# Patient Record
Sex: FEMALE | Race: WHITE | NOT HISPANIC OR LATINO | Employment: FULL TIME | RURAL
[De-identification: names, ages, dates, MRNs, and addresses within clinical notes are randomized per-mention and may not be internally consistent; named-entity substitution may affect disease eponyms.]

---

## 2017-10-26 ENCOUNTER — CONVERSION ENCOUNTER (OUTPATIENT)
Dept: FAMILY MEDICINE CLINIC | Facility: CLINIC | Age: 27
End: 2017-10-26

## 2017-10-27 LAB
ALBUMIN SERPL-MCNC: 3.9 G/DL (ref 3.6–5.1)
ALP SERPL-CCNC: 42 U/L (ref 33–115)
AST SERPL-CCNC: 17 U/L (ref 10–30)
BASOPHILS # BLD AUTO: 100 CELLS/UL (ref 0–200)
BILIRUB SERPL-MCNC: 0.2 MG/DL (ref 0.2–1.2)
BUN SERPL-MCNC: 6 MG/DL (ref 7–25)
BUN/CREAT SERPL: 8.6 (CALC) (ref 6–22)
CALCIUM SERPL-MCNC: 9.5 MG/DL (ref 8.6–10.2)
CHLORIDE SERPL-SCNC: 103 MMOL/L (ref 98–110)
CHOLEST SERPL-MCNC: 216 MG/DL (ref 125–200)
CONV CO2: 22 MMOL/L (ref 21–33)
CONV TOTAL PROTEIN: 7.2 G/DL (ref 6.2–8.3)
CREAT UR-MCNC: 0.7 MG/DL (ref 0.57–1.03)
EOSINOPHIL # BLD AUTO: 300 CELLS/UL (ref 15–500)
EOSINOPHIL # BLD AUTO: 5 %
ERYTHROCYTE [DISTWIDTH] IN BLOOD BY AUTOMATED COUNT: 16.4 % (ref 11–15)
GLOBULIN UR ELPH-MCNC: 3.3 MG/DL (ref 2.2–3.9)
GLUCOSE UR QL: 74 MG/DL (ref 65–99)
HCT VFR BLD AUTO: 36.1 % (ref 35–45)
HDLC SERPL-MCNC: 39 MG/DL
HGB BLD-MCNC: 11.7 G/DL (ref 11.7–15.5)
INSULIN SERPL-ACNC: 1.2 (CALC) (ref 1–2.1)
LDLC SERPL CALC-MCNC: 133 MG/DL
LYMPHOCYTES # BLD AUTO: 2200 CELLS/UL (ref 850–3900)
LYMPHOCYTES NFR BLD AUTO: 31 %
MCH RBC QN AUTO: 24.2 PG (ref 27–33)
MCHC RBC AUTO-ENTMCNC: 32.5 G/DL (ref 32–36)
MCV RBC AUTO: 74.4 FL (ref 80–100)
MONOCYTES # BLD AUTO: 400 CELLS/UL (ref 200–950)
MONOCYTES NFR BLD AUTO: 6 %
NEUTROPHILS # BLD AUTO: 4000 CELLS/UL (ref 1500–7800)
NEUTROPHILS NFR BLD AUTO: 58 %
PLATELET # BLD AUTO: 264 10*3/UL (ref 140–400)
PMV BLD AUTO: 8.5 FL (ref 7.5–11.5)
POTASSIUM SERPL-SCNC: 4.5 MMOL/L (ref 3.5–5.3)
RBC # BLD AUTO: 4.85 MILLION/UL (ref 3.8–5.1)
SODIUM SERPL-SCNC: 137 MMOL/L (ref 135–146)
TRIGL SERPL-MCNC: 219 MG/DL
TSH SERPL-ACNC: 2.14 MIU/L
WBC # BLD AUTO: 7 10*3/UL (ref 3.8–10.8)

## 2017-11-01 ENCOUNTER — CONVERSION ENCOUNTER (OUTPATIENT)
Dept: FAMILY MEDICINE CLINIC | Facility: CLINIC | Age: 27
End: 2017-11-01

## 2017-11-01 LAB — TB SKIN TEST: NEGATIVE MM

## 2020-02-28 ENCOUNTER — HOSPITAL ENCOUNTER (OUTPATIENT)
Dept: GENERAL RADIOLOGY | Facility: HOSPITAL | Age: 30
Discharge: HOME | End: 2020-02-28
Admitting: FAMILY MEDICINE

## 2020-02-28 ENCOUNTER — OFFICE VISIT (OUTPATIENT)
Dept: FAMILY MEDICINE CLINIC | Facility: CLINIC | Age: 30
End: 2020-02-28

## 2020-02-28 VITALS
BODY MASS INDEX: 30.29 KG/M2 | HEART RATE: 112 BPM | HEIGHT: 67 IN | DIASTOLIC BLOOD PRESSURE: 76 MMHG | TEMPERATURE: 98 F | WEIGHT: 193 LBS | OXYGEN SATURATION: 99 % | SYSTOLIC BLOOD PRESSURE: 118 MMHG

## 2020-02-28 DIAGNOSIS — F33.1 MODERATE EPISODE OF RECURRENT MAJOR DEPRESSIVE DISORDER (HCC): Primary | ICD-10-CM

## 2020-02-28 DIAGNOSIS — M54.12 CERVICAL RADICULOPATHY: ICD-10-CM

## 2020-02-28 DIAGNOSIS — M54.2 NECK PAIN: ICD-10-CM

## 2020-02-28 DIAGNOSIS — M79.642 BILATERAL HAND PAIN: ICD-10-CM

## 2020-02-28 DIAGNOSIS — F41.9 ANXIETY: ICD-10-CM

## 2020-02-28 DIAGNOSIS — A60.04 HERPES SIMPLEX VULVOVAGINITIS: ICD-10-CM

## 2020-02-28 DIAGNOSIS — Z00.01 ENCOUNTER FOR GENERAL ADULT MEDICAL EXAMINATION WITH ABNORMAL FINDINGS: ICD-10-CM

## 2020-02-28 DIAGNOSIS — M79.641 BILATERAL HAND PAIN: ICD-10-CM

## 2020-02-28 DIAGNOSIS — Z13.220 SCREENING FOR HYPERLIPIDEMIA: ICD-10-CM

## 2020-02-28 PROBLEM — F32.A DEPRESSIVE DISORDER: Status: RESOLVED | Noted: 2020-02-28 | Resolved: 2020-02-28

## 2020-02-28 PROBLEM — K90.41 GLUTEN-SENSITIVE ENTEROPATHY: Status: ACTIVE | Noted: 2020-02-28

## 2020-02-28 PROBLEM — R53.83 FATIGUE: Status: ACTIVE | Noted: 2020-02-28

## 2020-02-28 PROBLEM — D64.9 ANEMIA: Status: ACTIVE | Noted: 2020-02-28

## 2020-02-28 PROBLEM — F32.A DEPRESSIVE DISORDER: Status: ACTIVE | Noted: 2020-02-28

## 2020-02-28 PROCEDURE — 99213 OFFICE O/P EST LOW 20 MIN: CPT | Performed by: FAMILY MEDICINE

## 2020-02-28 PROCEDURE — 99395 PREV VISIT EST AGE 18-39: CPT | Performed by: FAMILY MEDICINE

## 2020-02-28 PROCEDURE — 72050 X-RAY EXAM NECK SPINE 4/5VWS: CPT

## 2020-02-28 RX ORDER — HYDROXYZINE HYDROCHLORIDE 25 MG/1
25 TABLET, FILM COATED ORAL NIGHTLY PRN
Qty: 30 TABLET | Refills: 3 | Status: SHIPPED | OUTPATIENT
Start: 2020-02-28 | End: 2021-01-19

## 2020-02-28 RX ORDER — ACYCLOVIR 200 MG/1
200 CAPSULE ORAL
Qty: 25 CAPSULE | Refills: 12 | Status: SHIPPED | OUTPATIENT
Start: 2020-02-28 | End: 2021-01-19

## 2020-02-28 RX ORDER — NAPROXEN 500 MG/1
500 TABLET ORAL 2 TIMES DAILY
Qty: 60 TABLET | Refills: 2 | Status: SHIPPED | OUTPATIENT
Start: 2020-02-28 | End: 2021-01-19

## 2020-02-28 RX ORDER — BUSPIRONE HYDROCHLORIDE 7.5 MG/1
7.5 TABLET ORAL TAKE AS DIRECTED
Qty: 53 TABLET | Refills: 1 | Status: SHIPPED | OUTPATIENT
Start: 2020-02-28 | End: 2020-03-30

## 2020-02-28 NOTE — PROGRESS NOTES
"Subjective   Mary Mathur is a 29 y.o. female.     Chief Complaint   Patient presents with   • Annual Exam   • Hand Pain   • Depression     Mary is here for a Well Woman Visit. Energy level is described as poor and she is sleeping poorly. She sleeps 3 hours nightly. Last pap was 10/2/2017 (negative). Contraception: spermicide. Patient exercises 2-3 times a week( yoga) . Nutrition is described as in general, an \"unhealthy\" diet. She reports that she does perform monthly self breast exam.      Hand Pain    The incident occurred more than 1 week ago. There was no injury mechanism. The pain is present in the left hand and right hand. The quality of the pain is described as stabbing. The pain does not radiate. The pain is at a severity of 4/10. The pain is moderate. The pain has been constant since the incident. Associated symptoms include tingling. Pertinent negatives include no chest pain. Nothing aggravates the symptoms. She has tried acetaminophen and NSAIDs for the symptoms. The treatment provided no relief.   Depression   Visit Type: initial  Onset of symptoms: more than 1 year ago  Progression since onset: gradually worsening  Patient presents with the following symptoms: depressed mood, excessive worry, insomnia and nervousness/anxiety.  Patient is not experiencing: feelings of hopelessness, feelings of worthlessness, shortness of breath, suicidal ideas, suicidal planning and thoughts of death.  Frequency of symptoms: most days   Severity: moderate   Sleep quality: fair       She has always had a little numbess on occasion in the left hand, but over the last 10 months she has noticed that both of her hands tingle and go numb. The numbness in the palms.   Her sister  last year  She has tried 5ht and st vega wort for a few months and going to therapy. This is what keeps her from sleeping.     The following portions of the patient's history were reviewed and updated as appropriate: allergies, current " medications, past family history, past medical history, past social history, past surgical history and problem list.    Allergies:  Allergies   Allergen Reactions   • Sulfa Antibiotics Itching       Social History:  Social History     Socioeconomic History   • Marital status: Unknown     Spouse name: Not on file   • Number of children: Not on file   • Years of education: Not on file   • Highest education level: Not on file   Tobacco Use   • Smoking status: Current Every Day Smoker     Types: Electronic Cigarette   • Smokeless tobacco: Never Used   Substance and Sexual Activity   • Alcohol use: Yes     Comment: Remotely quit alcohol use. Quit 01/2005   • Drug use: Not Currently     Comment: Pt recently quit 01/05/2014       Family History:  Family History   Problem Relation Age of Onset   • Cervical cancer Mother        Past Medical History :  Patient Active Problem List   Diagnosis   • Anemia   • Anxiety   • Herpes simplex vulvovaginitis   • Moderate episode of recurrent major depressive disorder (CMS/HCC)   • Neck pain   • Cervical radiculitis   • Gluten-sensitive enteropathy   • Fatigue       Medication List:    Current Outpatient Medications:   •  acyclovir (ZOVIRAX) 200 MG capsule, Take 1 capsule by mouth Every 4 (Four) Hours While Awake. Take at first sign of recurrence., Disp: 25 capsule, Rfl: 12  •  busPIRone (BUSPAR) 7.5 MG tablet, Take 1 tablet by mouth Take As Directed. 1 daily for 7 days, then BID., Disp: 53 tablet, Rfl: 1  •  hydrOXYzine (ATARAX) 25 MG tablet, Take 1 tablet by mouth At Night As Needed for Anxiety., Disp: 30 tablet, Rfl: 3  •  naproxen (NAPROSYN) 500 MG tablet, Take 1 tablet by mouth 2 (Two) Times a Day., Disp: 60 tablet, Rfl: 2    Past Surgical History:  Past Surgical History:   Procedure Laterality Date   • DILATATION AND CURETTAGE  2010    Of Uterus   • TONSILLECTOMY AND ADENOIDECTOMY  1994       Review of Systems:  Review of Systems   Constitutional: Negative for activity change and  "fever.   HENT: Negative for ear pain, rhinorrhea, sinus pressure and voice change.    Eyes: Negative for visual disturbance.   Respiratory: Negative for cough and shortness of breath.    Cardiovascular: Negative for chest pain.   Gastrointestinal: Negative for abdominal pain, diarrhea, nausea and vomiting.   Endocrine: Negative for cold intolerance and heat intolerance.   Genitourinary: Negative for frequency and urgency.   Musculoskeletal: Negative for arthralgias.   Skin: Negative for rash.   Neurological: Positive for tingling. Negative for syncope.   Hematological: Does not bruise/bleed easily.   Psychiatric/Behavioral: Positive for depressed mood. Negative for suicidal ideas. The patient is nervous/anxious and has insomnia.        Physical Exam:  Vital Signs:  Visit Vitals  /76 (BP Location: Left arm, Patient Position: Sitting, Cuff Size: Adult)   Pulse 112   Temp 98 °F (36.7 °C) (Oral)   Ht 170.2 cm (67\")   Wt 87.5 kg (193 lb)   SpO2 99%   BMI 30.23 kg/m²       Physical Exam   Constitutional: She is oriented to person, place, and time. She appears well-developed and well-nourished. No distress.   HENT:   Head: Normocephalic and atraumatic.   Right Ear: External ear normal.   Left Ear: External ear normal.   Nose: Nose normal.   Mouth/Throat: Oropharynx is clear and moist. No oropharyngeal exudate.   Eyes: Pupils are equal, round, and reactive to light. Conjunctivae and EOM are normal. Right eye exhibits no discharge. Left eye exhibits no discharge. No scleral icterus.   Neck: Normal range of motion. Neck supple. No tracheal deviation present. No thyromegaly present.   Cardiovascular: Normal rate, regular rhythm, normal heart sounds and intact distal pulses. Exam reveals no gallop and no friction rub.   No murmur heard.  Pulmonary/Chest: Effort normal and breath sounds normal. No stridor. No respiratory distress. She has no wheezes. She has no rales.   Abdominal: Soft. Bowel sounds are normal. She " exhibits no distension and no mass. There is no tenderness. There is no rebound and no guarding.   Musculoskeletal: Normal range of motion. She exhibits no edema, tenderness or deformity.   Lymphadenopathy:     She has no cervical adenopathy.   Neurological: She is alert and oriented to person, place, and time. She has normal strength and normal reflexes. She displays no atrophy, no tremor and normal reflexes. No cranial nerve deficit or sensory deficit. She exhibits normal muscle tone. Coordination and gait normal.   Skin: Skin is warm and dry. Capillary refill takes less than 2 seconds. No rash noted. She is not diaphoretic. No erythema. No pallor.   Psychiatric: She has a normal mood and affect. Her behavior is normal. Judgment and thought content normal. Cognition and memory are normal. Cognition and memory are not impaired. She exhibits normal recent memory and normal remote memory.   Nursing note and vitals reviewed.      Assessment and Plan:  Problem List Items Addressed This Visit        Nervous and Auditory    Neck pain    Relevant Medications    naproxen (NAPROSYN) 500 MG tablet    Other Relevant Orders    XR Spine Cervical Complete 4 or 5 View (Completed)    Cervical radiculitis    Current Assessment & Plan     Will get EMG, xray and place her in PT         Relevant Medications    naproxen (NAPROSYN) 500 MG tablet       Genitourinary    Herpes simplex vulvovaginitis    Overview     Diagnosis, treatment and course discussed. Discussed medication, dosing and adverse effects. Return if there is worsening or persistance of symptoms  Refill medication         Relevant Medications    acyclovir (ZOVIRAX) 200 MG capsule       Other    Anxiety    Overview     Good social support, no suicidal or homicidal ideation. Diagnosis and treatment discussed. Discussed counseling. Discussed medication, dosing and adverse effects. Return in 4 weeks         Relevant Medications    busPIRone (BUSPAR) 7.5 MG tablet     hydrOXYzine (ATARAX) 25 MG tablet    Moderate episode of recurrent major depressive disorder (CMS/HCC) - Primary    Overview     Good social support, no suicidal or homicidal ideation. Diagnosis and treatment discussed. Discussed counseling. Discussed medication, dosing and adverse effects. Return in 4 weeks  [facility] in the past. Stable         Relevant Medications    busPIRone (BUSPAR) 7.5 MG tablet    hydrOXYzine (ATARAX) 25 MG tablet      Other Visit Diagnoses     Encounter for general adult medical examination with abnormal findings        Relevant Orders    CBC & Differential    Comprehensive Metabolic Panel    TSH    Bilateral hand pain        From neck    Screening for hyperlipidemia        Relevant Orders    Lipid Panel With / Chol / HDL Ratio        Ice three times a day for about 10-15 minutes for the first 1-2 days. Then may alternate heat and ice. Better body mechanics discussed. Home exercises discussed and hand out given. Discussed nsaids and if they can be taken. May need imaging and or PT if persists.  Good social support, no suicidal or homicidal ideation. Diagnosis and treatment discussed. Discussed counseling. Discussed medication, dosing and adverse effects. Return in 4 weeks    Declines gyn and breast exam today      An After Visit Summary and PPPS were given to the patient.

## 2020-03-02 ENCOUNTER — TRANSCRIBE ORDERS (OUTPATIENT)
Dept: PHYSICAL THERAPY | Facility: CLINIC | Age: 30
End: 2020-03-02

## 2020-03-02 DIAGNOSIS — M54.2 PAIN, NECK: Primary | ICD-10-CM

## 2020-03-03 LAB
ALBUMIN SERPL-MCNC: 4.2 G/DL (ref 3.8–4.8)
ALBUMIN/GLOB SERPL: 1.4 {RATIO} (ref 1.2–2.2)
ALP SERPL-CCNC: 66 IU/L (ref 39–117)
ALT SERPL-CCNC: 18 IU/L (ref 0–32)
AST SERPL-CCNC: 18 IU/L (ref 0–40)
BASOPHILS # BLD AUTO: 0 X10E3/UL (ref 0–0.2)
BASOPHILS NFR BLD AUTO: 1 %
BILIRUB SERPL-MCNC: <0.2 MG/DL (ref 0–1.2)
BUN SERPL-MCNC: 12 MG/DL (ref 6–20)
BUN/CREAT SERPL: 16 (ref 9–23)
CALCIUM SERPL-MCNC: 9.2 MG/DL (ref 8.7–10.2)
CHLORIDE SERPL-SCNC: 103 MMOL/L (ref 96–106)
CHOLEST SERPL-MCNC: 185 MG/DL (ref 100–199)
CHOLEST/HDLC SERPL: 5.4 RATIO (ref 0–4.4)
CO2 SERPL-SCNC: 22 MMOL/L (ref 20–29)
CREAT SERPL-MCNC: 0.75 MG/DL (ref 0.57–1)
EOSINOPHIL # BLD AUTO: 0.2 X10E3/UL (ref 0–0.4)
EOSINOPHIL NFR BLD AUTO: 3 %
ERYTHROCYTE [DISTWIDTH] IN BLOOD BY AUTOMATED COUNT: 14.2 % (ref 11.7–15.4)
GLOBULIN SER CALC-MCNC: 2.9 G/DL (ref 1.5–4.5)
GLUCOSE SERPL-MCNC: 102 MG/DL (ref 65–99)
HCT VFR BLD AUTO: 37.2 % (ref 34–46.6)
HDLC SERPL-MCNC: 34 MG/DL
HGB BLD-MCNC: 11.7 G/DL (ref 11.1–15.9)
IMM GRANULOCYTES # BLD AUTO: 0 X10E3/UL (ref 0–0.1)
IMM GRANULOCYTES NFR BLD AUTO: 0 %
LDLC SERPL CALC-MCNC: 106 MG/DL (ref 0–99)
LYMPHOCYTES # BLD AUTO: 2.6 X10E3/UL (ref 0.7–3.1)
LYMPHOCYTES NFR BLD AUTO: 34 %
MCH RBC QN AUTO: 24.9 PG (ref 26.6–33)
MCHC RBC AUTO-ENTMCNC: 31.5 G/DL (ref 31.5–35.7)
MCV RBC AUTO: 79 FL (ref 79–97)
MONOCYTES # BLD AUTO: 0.6 X10E3/UL (ref 0.1–0.9)
MONOCYTES NFR BLD AUTO: 7 %
NEUTROPHILS # BLD AUTO: 4.2 X10E3/UL (ref 1.4–7)
NEUTROPHILS NFR BLD AUTO: 55 %
PLATELET # BLD AUTO: 297 X10E3/UL (ref 150–450)
POTASSIUM SERPL-SCNC: 4.7 MMOL/L (ref 3.5–5.2)
PROT SERPL-MCNC: 7.1 G/DL (ref 6–8.5)
RBC # BLD AUTO: 4.69 X10E6/UL (ref 3.77–5.28)
SODIUM SERPL-SCNC: 137 MMOL/L (ref 134–144)
TRIGL SERPL-MCNC: 225 MG/DL (ref 0–149)
TSH SERPL DL<=0.005 MIU/L-ACNC: 2.57 UIU/ML (ref 0.45–4.5)
VLDLC SERPL CALC-MCNC: 45 MG/DL (ref 5–40)
WBC # BLD AUTO: 7.6 X10E3/UL (ref 3.4–10.8)

## 2020-03-30 ENCOUNTER — OFFICE VISIT (OUTPATIENT)
Dept: FAMILY MEDICINE CLINIC | Facility: CLINIC | Age: 30
End: 2020-03-30

## 2020-03-30 VITALS
HEART RATE: 108 BPM | TEMPERATURE: 98.6 F | BODY MASS INDEX: 29.49 KG/M2 | RESPIRATION RATE: 18 BRPM | HEIGHT: 67 IN | OXYGEN SATURATION: 97 % | SYSTOLIC BLOOD PRESSURE: 122 MMHG | DIASTOLIC BLOOD PRESSURE: 80 MMHG | WEIGHT: 187.9 LBS

## 2020-03-30 DIAGNOSIS — F33.1 MODERATE EPISODE OF RECURRENT MAJOR DEPRESSIVE DISORDER (HCC): Primary | ICD-10-CM

## 2020-03-30 DIAGNOSIS — E78.2 MIXED HYPERLIPIDEMIA: ICD-10-CM

## 2020-03-30 DIAGNOSIS — F41.9 ANXIETY: ICD-10-CM

## 2020-03-30 DIAGNOSIS — M54.2 NECK PAIN: ICD-10-CM

## 2020-03-30 PROBLEM — E78.5 HYPERLIPIDEMIA: Status: ACTIVE | Noted: 2020-03-30

## 2020-03-30 PROCEDURE — 99214 OFFICE O/P EST MOD 30 MIN: CPT | Performed by: FAMILY MEDICINE

## 2020-03-30 RX ORDER — BUSPIRONE HYDROCHLORIDE 10 MG/1
10 TABLET ORAL 3 TIMES DAILY
Qty: 90 TABLET | Refills: 3 | Status: SHIPPED | OUTPATIENT
Start: 2020-03-30 | End: 2021-01-19

## 2020-03-30 NOTE — PROGRESS NOTES
Subjective   Mary Mathur is a 29 y.o. female.     Chief Complaint   Patient presents with   • Hyperlipidemia   • Neck Pain       Hyperlipidemia   This is a new problem. This is a new diagnosis. The problem is uncontrolled. Recent lipid tests were reviewed and are high. Factors aggravating her hyperlipidemia include smoking (e- cig). Pertinent negatives include no chest pain or focal sensory loss. She is currently on no antihyperlipidemic treatment.   Neck Pain    This is a recurrent problem. The current episode started more than 1 month ago. The problem occurs every several days. The problem has been unchanged. The pain is associated with a sleep position. The pain is present in the left side and right side. The quality of the pain is described as cramping. The pain is at a severity of 8/10. The pain is moderate. Exacerbated by: work- message therapist , repetative motion.  The pain is same all the time. Associated symptoms include headaches, numbness, tingling and weakness. Pertinent negatives include no chest pain or fever. She has tried acetaminophen, heat, ice and NSAIDs for the symptoms.   Anxiety   Presents for follow-up visit. Symptoms include excessive worry. Patient reports no chest pain, depressed mood, dry mouth, nausea, nervous/anxious behavior or suicidal ideas. Symptoms occur constantly. The severity of symptoms is moderate. The quality of sleep is good.     Compliance with medications is %.    She has not started PT yet and she has not scheduled her EMG yet also. She is too worried about COVID to do those yet.   The pain in her neck is still there. It not as bad as it was. Hurts every 2-3 days instead of daily since she has not been at work.     The following portions of the patient's history were reviewed and updated as appropriate: allergies, current medications, past family history, past medical history, past social history, past surgical history and problem  list.    Allergies:  Allergies   Allergen Reactions   • Sulfa Antibiotics Itching       Social History:  Social History     Socioeconomic History   • Marital status: Unknown     Spouse name: Not on file   • Number of children: Not on file   • Years of education: Not on file   • Highest education level: Not on file   Tobacco Use   • Smoking status: Current Every Day Smoker     Types: Electronic Cigarette   • Smokeless tobacco: Never Used   Substance and Sexual Activity   • Alcohol use: Yes     Comment: Remotely quit alcohol use. Quit 01/2005   • Drug use: Not Currently     Comment: Pt recently quit 01/05/2014       Family History:  Family History   Problem Relation Age of Onset   • Cervical cancer Mother        Past Medical History :  Patient Active Problem List   Diagnosis   • Anemia   • Anxiety   • Herpes simplex vulvovaginitis   • Moderate episode of recurrent major depressive disorder (CMS/HCC)   • Neck pain   • Cervical radiculitis   • Gluten-sensitive enteropathy   • Fatigue   • Mixed hyperlipidemia       Medication List:    Current Outpatient Medications:   •  acyclovir (ZOVIRAX) 200 MG capsule, Take 1 capsule by mouth Every 4 (Four) Hours While Awake. Take at first sign of recurrence., Disp: 25 capsule, Rfl: 12  •  hydrOXYzine (ATARAX) 25 MG tablet, Take 1 tablet by mouth At Night As Needed for Anxiety., Disp: 30 tablet, Rfl: 3  •  naproxen (NAPROSYN) 500 MG tablet, Take 1 tablet by mouth 2 (Two) Times a Day., Disp: 60 tablet, Rfl: 2  •  busPIRone (BUSPAR) 10 MG tablet, Take 1 tablet by mouth 3 (Three) Times a Day., Disp: 90 tablet, Rfl: 3    Past Surgical History:  Past Surgical History:   Procedure Laterality Date   • DILATATION AND CURETTAGE  2010    Of Uterus   • TONSILLECTOMY AND ADENOIDECTOMY  1994       Review of Systems:  Review of Systems   Constitutional: Negative for activity change and fever.   HENT: Negative for ear pain, rhinorrhea, sinus pressure and voice change.    Eyes: Negative for visual  "disturbance.   Respiratory: Negative for cough.    Cardiovascular: Negative for chest pain.   Gastrointestinal: Negative for abdominal pain, diarrhea, nausea and vomiting.   Endocrine: Negative for cold intolerance and heat intolerance.   Genitourinary: Negative for frequency and urgency.   Musculoskeletal: Positive for neck pain. Negative for arthralgias.   Skin: Negative for rash.   Neurological: Positive for tingling, weakness and numbness. Negative for syncope.   Hematological: Does not bruise/bleed easily.   Psychiatric/Behavioral: Negative for suicidal ideas and depressed mood. The patient is not nervous/anxious.        Physical Exam:  Vital Signs:  Visit Vitals  /80 (BP Location: Right arm)   Pulse 108   Temp 98.6 °F (37 °C)   Resp 18   Ht 170.2 cm (67\")   Wt 85.2 kg (187 lb 14.4 oz)   SpO2 97%   BMI 29.43 kg/m²       Physical Exam   Constitutional: She is oriented to person, place, and time. She appears well-developed and well-nourished. She is cooperative.   Cardiovascular: Normal rate, regular rhythm and normal heart sounds. Exam reveals no gallop and no friction rub.   No murmur heard.  Pulmonary/Chest: Effort normal and breath sounds normal. She has no wheezes. She has no rales.   Neurological: She is alert and oriented to person, place, and time. Coordination normal.   Skin: Skin is warm and dry.   Psychiatric: She has a normal mood and affect.   Vitals reviewed.      Assessment and Plan:  Problem List Items Addressed This Visit        Cardiovascular and Mediastinum    Mixed hyperlipidemia    Overview     Went over labs. Her TG went up but total and LDL came down. Discussed diet. She has let go of soda. She is working on increasing veggies and lean meats            Nervous and Auditory    Neck pain    Overview     Still there but not as bad. She is waiting until the restrictions on COVID are lifted to get PT and her EMG.             Other    Anxiety    Overview     Worse with the virus pandemic. " Increase buspar to 10mg three times a day and see if that helps. Reevaluate in 4 weeks or sooner if needed         Relevant Medications    busPIRone (BUSPAR) 10 MG tablet    Moderate episode of recurrent major depressive disorder (CMS/HCC) - Primary    Overview     stable         Relevant Medications    busPIRone (BUSPAR) 10 MG tablet          An After Visit Summary and PPPS were given to the patient.             I wore protective equipment throughout this patient encounter to include mask, gloves and eye protection. Hand hygiene was performed before donning protective equipment and after removal when leaving the room.

## 2020-04-30 ENCOUNTER — TELEPHONE (OUTPATIENT)
Dept: FAMILY MEDICINE CLINIC | Facility: CLINIC | Age: 30
End: 2020-04-30

## 2020-06-25 ENCOUNTER — OFFICE VISIT (OUTPATIENT)
Dept: FAMILY MEDICINE CLINIC | Facility: CLINIC | Age: 30
End: 2020-06-25

## 2020-06-25 VITALS
HEART RATE: 75 BPM | OXYGEN SATURATION: 98 % | BODY MASS INDEX: 28.41 KG/M2 | SYSTOLIC BLOOD PRESSURE: 110 MMHG | WEIGHT: 181 LBS | HEIGHT: 67 IN | TEMPERATURE: 99.3 F | RESPIRATION RATE: 16 BRPM | DIASTOLIC BLOOD PRESSURE: 70 MMHG

## 2020-06-25 DIAGNOSIS — N20.0 LEFT RENAL STONE: Primary | ICD-10-CM

## 2020-06-25 DIAGNOSIS — D72.825 BANDEMIA: ICD-10-CM

## 2020-06-25 PROBLEM — D72.829 ELEVATED WBC COUNT: Status: ACTIVE | Noted: 2020-06-25

## 2020-06-25 LAB
BILIRUB BLD-MCNC: NEGATIVE MG/DL
CLARITY, POC: ABNORMAL
COLOR UR: YELLOW
GLUCOSE UR STRIP-MCNC: NEGATIVE MG/DL
KETONES UR QL: NEGATIVE
LEUKOCYTE EST, POC: NEGATIVE
NITRITE UR-MCNC: NEGATIVE MG/ML
PH UR: 6 [PH] (ref 5–8)
PROT UR STRIP-MCNC: ABNORMAL MG/DL
RBC # UR STRIP: ABNORMAL /UL
SP GR UR: 1.01 (ref 1–1.03)
UROBILINOGEN UR QL: NORMAL

## 2020-06-25 PROCEDURE — 99213 OFFICE O/P EST LOW 20 MIN: CPT | Performed by: FAMILY MEDICINE

## 2020-06-25 RX ORDER — IBUPROFEN 800 MG/1
TABLET ORAL
COMMUNITY
Start: 2020-06-23 | End: 2021-01-19

## 2020-06-25 RX ORDER — ONDANSETRON HYDROCHLORIDE 8 MG/1
8 TABLET, FILM COATED ORAL 3 TIMES DAILY
COMMUNITY
Start: 2020-06-23 | End: 2020-06-29 | Stop reason: SDUPTHER

## 2020-06-25 RX ORDER — HYDROCODONE BITARTRATE AND ACETAMINOPHEN 10; 325 MG/1; MG/1
TABLET ORAL
COMMUNITY
Start: 2020-06-23 | End: 2020-06-29 | Stop reason: SDUPTHER

## 2020-06-25 NOTE — PROGRESS NOTES
Subjective   Mary Mathur is a 29 y.o. female.     Chief Complaint   Patient presents with   • Flank Pain       Flank Pain   This is a new problem. The current episode started in the past 7 days. The problem occurs intermittently. The problem has been waxing and waning since onset. The quality of the pain is described as cramping, stabbing and burning. The pain is at a severity of 3/10. The pain is mild. The pain is the same all the time. Associated symptoms include abdominal pain, dysuria and pelvic pain. Pertinent negatives include no chest pain or fever. Treatments tried: Pain medication / nausea  medication. The treatment provided mild relief.    CT scan with 5mm stone on left uvj. She had a hard time urinating was when she went to the ER and stone found. She has been able to urinate since yesterday.     The following portions of the patient's history were reviewed and updated as appropriate: allergies, current medications, past family history, past medical history, past social history, past surgical history and problem list.    Allergies:  Allergies   Allergen Reactions   • Sulfa Antibiotics Itching       Social History:  Social History     Socioeconomic History   • Marital status: Unknown     Spouse name: Not on file   • Number of children: Not on file   • Years of education: Not on file   • Highest education level: Not on file   Tobacco Use   • Smoking status: Current Every Day Smoker     Types: Electronic Cigarette   • Smokeless tobacco: Never Used   Substance and Sexual Activity   • Alcohol use: Yes     Comment: Remotely quit alcohol use. Quit 01/2005   • Drug use: Not Currently     Comment: Pt recently quit 01/05/2014       Family History:  Family History   Problem Relation Age of Onset   • Cervical cancer Mother        Past Medical History :  Patient Active Problem List   Diagnosis   • Anemia   • Anxiety   • Herpes simplex vulvovaginitis   • Moderate episode of recurrent major depressive disorder  (CMS/HCC)   • Neck pain   • Cervical radiculitis   • Gluten-sensitive enteropathy   • Fatigue   • Mixed hyperlipidemia   • Left renal stone   • Elevated WBC count   • Paronychia of left middle finger       Medication List:    Current Outpatient Medications:   •  acyclovir (ZOVIRAX) 200 MG capsule, Take 1 capsule by mouth Every 4 (Four) Hours While Awake. Take at first sign of recurrence., Disp: 25 capsule, Rfl: 12  •  busPIRone (BUSPAR) 10 MG tablet, Take 1 tablet by mouth 3 (Three) Times a Day., Disp: 90 tablet, Rfl: 3  •  hydrOXYzine (ATARAX) 25 MG tablet, Take 1 tablet by mouth At Night As Needed for Anxiety., Disp: 30 tablet, Rfl: 3  •  naproxen (NAPROSYN) 500 MG tablet, Take 1 tablet by mouth 2 (Two) Times a Day., Disp: 60 tablet, Rfl: 2  •  cephalexin (KEFLEX) 500 MG capsule, Take 1 capsule by mouth 3 (Three) Times a Day., Disp: 30 capsule, Rfl: 0  •  HYDROcodone-acetaminophen (NORCO)  MG per tablet, 1/2 to 1 tablet every 8 hours as needed for pain, Disp: 7 tablet, Rfl: 0  •  ibuprofen (ADVIL,MOTRIN) 800 MG tablet, TAKE 1 TABLET BY MOUTH THREE TIMES DAILY WITH FOOD OR MILK FOR 5 DAYS, Disp: , Rfl:   •  mupirocin (BACTROBAN) 2 % ointment, Apply  topically to the appropriate area as directed 3 (Three) Times a Day., Disp: 22 g, Rfl: 0  •  ondansetron (ZOFRAN) 8 MG tablet, Take 1 tablet by mouth 3 (Three) Times a Day., Disp: 10 tablet, Rfl: 1    Past Surgical History:  Past Surgical History:   Procedure Laterality Date   • DILATATION AND CURETTAGE  2010    Of Uterus   • TONSILLECTOMY AND ADENOIDECTOMY  1994       Review of Systems:  Review of Systems   Constitutional: Negative for activity change and fever.   HENT: Negative for ear pain, rhinorrhea, sinus pressure and voice change.    Eyes: Negative for visual disturbance.   Respiratory: Negative for cough and shortness of breath.    Cardiovascular: Negative for chest pain.   Gastrointestinal: Positive for abdominal pain. Negative for diarrhea, nausea and  "vomiting.   Endocrine: Negative for cold intolerance and heat intolerance.   Genitourinary: Positive for dysuria, flank pain and pelvic pain. Negative for frequency and urgency.   Musculoskeletal: Negative for arthralgias.   Skin: Negative for rash.   Neurological: Negative for syncope.   Hematological: Does not bruise/bleed easily.   Psychiatric/Behavioral: Negative for depressed mood. The patient is not nervous/anxious.        Physical Exam:  Vital Signs:  Visit Vitals  /70 (BP Location: Right arm)   Pulse 75   Temp 99.3 °F (37.4 °C) (Oral)   Resp 16   Ht 170.2 cm (67\")   Wt 82.1 kg (181 lb)   LMP 06/03/2020 (Approximate)   SpO2 98%   BMI 28.35 kg/m²       Physical Exam   Constitutional: She is oriented to person, place, and time. She appears well-developed and well-nourished. She is cooperative.   Cardiovascular: Normal rate, regular rhythm and normal heart sounds. Exam reveals no gallop and no friction rub.   No murmur heard.  Pulmonary/Chest: Effort normal and breath sounds normal. She has no wheezes. She has no rales.   Abdominal: Soft. Bowel sounds are normal. She exhibits no distension and no mass. There is no tenderness. There is no rebound.   Neurological: She is alert and oriented to person, place, and time. Coordination normal.   Skin: Skin is warm and dry.   Psychiatric: She has a normal mood and affect.   Vitals reviewed.      Assessment and Plan:  Problem List Items Addressed This Visit        Immune and Lymphatic    Elevated WBC count    Relevant Medications    ibuprofen (ADVIL,MOTRIN) 800 MG tablet    Other Relevant Orders    CBC & Differential (Completed)       Genitourinary    Left renal stone - Primary    Current Assessment & Plan     5mm in uvj on left  Will have her see a urology         Relevant Orders    POC Urinalysis Dipstick, Multipro (Completed)          An After Visit Summary and PPPS were given to the patient.             I wore protective equipment throughout this patient " encounter to include mask and gloves. Hand hygiene was performed before donning protective equipment and after removal when leaving the room.

## 2020-06-26 LAB
BASOPHILS # BLD AUTO: 0.1 X10E3/UL (ref 0–0.2)
BASOPHILS NFR BLD AUTO: 1 %
EOSINOPHIL # BLD AUTO: 0.1 X10E3/UL (ref 0–0.4)
EOSINOPHIL NFR BLD AUTO: 1 %
ERYTHROCYTE [DISTWIDTH] IN BLOOD BY AUTOMATED COUNT: 15 % (ref 11.7–15.4)
HCT VFR BLD AUTO: 38.5 % (ref 34–46.6)
HGB BLD-MCNC: 11.7 G/DL (ref 11.1–15.9)
IMM GRANULOCYTES # BLD AUTO: 0 X10E3/UL (ref 0–0.1)
IMM GRANULOCYTES NFR BLD AUTO: 0 %
LYMPHOCYTES # BLD AUTO: 2.3 X10E3/UL (ref 0.7–3.1)
LYMPHOCYTES NFR BLD AUTO: 27 %
MCH RBC QN AUTO: 24.6 PG (ref 26.6–33)
MCHC RBC AUTO-ENTMCNC: 30.4 G/DL (ref 31.5–35.7)
MCV RBC AUTO: 81 FL (ref 79–97)
MONOCYTES # BLD AUTO: 0.6 X10E3/UL (ref 0.1–0.9)
MONOCYTES NFR BLD AUTO: 7 %
NEUTROPHILS # BLD AUTO: 5.5 X10E3/UL (ref 1.4–7)
NEUTROPHILS NFR BLD AUTO: 64 %
PLATELET # BLD AUTO: 292 X10E3/UL (ref 150–450)
RBC # BLD AUTO: 4.75 X10E6/UL (ref 3.77–5.28)
WBC # BLD AUTO: 8.5 X10E3/UL (ref 3.4–10.8)

## 2020-06-29 ENCOUNTER — OFFICE VISIT (OUTPATIENT)
Dept: FAMILY MEDICINE CLINIC | Facility: CLINIC | Age: 30
End: 2020-06-29

## 2020-06-29 ENCOUNTER — HOSPITAL ENCOUNTER (OUTPATIENT)
Dept: GENERAL RADIOLOGY | Facility: HOSPITAL | Age: 30
Discharge: HOME | End: 2020-06-29
Admitting: FAMILY MEDICINE

## 2020-06-29 VITALS
RESPIRATION RATE: 16 BRPM | SYSTOLIC BLOOD PRESSURE: 106 MMHG | HEIGHT: 67 IN | BODY MASS INDEX: 28 KG/M2 | HEART RATE: 100 BPM | OXYGEN SATURATION: 97 % | WEIGHT: 178.4 LBS | TEMPERATURE: 97 F | DIASTOLIC BLOOD PRESSURE: 66 MMHG

## 2020-06-29 DIAGNOSIS — R11.0 NAUSEA: Primary | ICD-10-CM

## 2020-06-29 DIAGNOSIS — L03.012 PARONYCHIA OF LEFT MIDDLE FINGER: ICD-10-CM

## 2020-06-29 DIAGNOSIS — N20.0 LEFT RENAL STONE: ICD-10-CM

## 2020-06-29 PROCEDURE — 74018 RADEX ABDOMEN 1 VIEW: CPT

## 2020-06-29 PROCEDURE — 99214 OFFICE O/P EST MOD 30 MIN: CPT | Performed by: FAMILY MEDICINE

## 2020-06-29 RX ORDER — HYDROCODONE BITARTRATE AND ACETAMINOPHEN 10; 325 MG/1; MG/1
TABLET ORAL
Qty: 7 TABLET | Refills: 0 | Status: SHIPPED | OUTPATIENT
Start: 2020-06-29 | End: 2021-01-19

## 2020-06-29 RX ORDER — ONDANSETRON HYDROCHLORIDE 8 MG/1
8 TABLET, FILM COATED ORAL 3 TIMES DAILY
Qty: 10 TABLET | Refills: 1 | Status: SHIPPED | OUTPATIENT
Start: 2020-06-29 | End: 2021-01-19

## 2020-06-29 RX ORDER — CEPHALEXIN 500 MG/1
500 CAPSULE ORAL 3 TIMES DAILY
Qty: 30 CAPSULE | Refills: 0 | Status: SHIPPED | OUTPATIENT
Start: 2020-06-29 | End: 2021-01-19

## 2020-06-29 NOTE — ASSESSMENT & PLAN NOTE
Mild from working on fence. Multiple fingers on left hand. Start antibiotics  Diagnosis, treatment and and course discussed. Potential side effects discussed. Return if there is worsening or persistence of symptoms.

## 2020-06-29 NOTE — PROGRESS NOTES
Subjective   Mary Mathur is a 29 y.o. female.     Chief Complaint   Patient presents with   • Flank Pain       Fingers on left hand started to get red around nails. No drainage, hurts some. Worse with grabbing things, better with rest. Started in the last few days. Not changed in the last few days. She has not tried anything    Flank Pain   This is a new problem. The current episode started in the past 7 days. The problem occurs constantly. The problem has been waxing and waning since onset. The pain is present in the lumbar spine. The quality of the pain is described as aching, burning and stabbing. The pain is at a severity of 7/10. The pain is moderate. The pain is the same all the time. Associated symptoms include dysuria and headaches. Pertinent negatives include no chest pain or fever. The treatment provided no relief.   She has not seen urology yet     The following portions of the patient's history were reviewed and updated as appropriate: allergies, current medications, past family history, past medical history, past social history, past surgical history and problem list.    Allergies:  Allergies   Allergen Reactions   • Sulfa Antibiotics Itching       Social History:  Social History     Socioeconomic History   • Marital status: Unknown     Spouse name: Not on file   • Number of children: Not on file   • Years of education: Not on file   • Highest education level: Not on file   Tobacco Use   • Smoking status: Current Every Day Smoker     Types: Electronic Cigarette   • Smokeless tobacco: Never Used   Substance and Sexual Activity   • Alcohol use: Yes     Comment: Remotely quit alcohol use. Quit 01/2005   • Drug use: Not Currently     Comment: Pt recently quit 01/05/2014       Family History:  Family History   Problem Relation Age of Onset   • Cervical cancer Mother        Past Medical History :  Patient Active Problem List   Diagnosis   • Anemia   • Anxiety   • Herpes simplex vulvovaginitis   •  Moderate episode of recurrent major depressive disorder (CMS/HCC)   • Neck pain   • Cervical radiculitis   • Gluten-sensitive enteropathy   • Fatigue   • Mixed hyperlipidemia   • Left renal stone   • Paronychia of left middle finger       Medication List:    Current Outpatient Medications:   •  acyclovir (ZOVIRAX) 200 MG capsule, Take 1 capsule by mouth Every 4 (Four) Hours While Awake. Take at first sign of recurrence., Disp: 25 capsule, Rfl: 12  •  busPIRone (BUSPAR) 10 MG tablet, Take 1 tablet by mouth 3 (Three) Times a Day., Disp: 90 tablet, Rfl: 3  •  HYDROcodone-acetaminophen (NORCO)  MG per tablet, 1/2 to 1 tablet every 8 hours as needed for pain, Disp: 7 tablet, Rfl: 0  •  hydrOXYzine (ATARAX) 25 MG tablet, Take 1 tablet by mouth At Night As Needed for Anxiety., Disp: 30 tablet, Rfl: 3  •  ibuprofen (ADVIL,MOTRIN) 800 MG tablet, TAKE 1 TABLET BY MOUTH THREE TIMES DAILY WITH FOOD OR MILK FOR 5 DAYS, Disp: , Rfl:   •  naproxen (NAPROSYN) 500 MG tablet, Take 1 tablet by mouth 2 (Two) Times a Day., Disp: 60 tablet, Rfl: 2  •  ondansetron (ZOFRAN) 8 MG tablet, Take 1 tablet by mouth 3 (Three) Times a Day., Disp: 10 tablet, Rfl: 1  •  cephalexin (KEFLEX) 500 MG capsule, Take 1 capsule by mouth 3 (Three) Times a Day., Disp: 30 capsule, Rfl: 0  •  mupirocin (BACTROBAN) 2 % ointment, Apply  topically to the appropriate area as directed 3 (Three) Times a Day., Disp: 22 g, Rfl: 0    Past Surgical History:  Past Surgical History:   Procedure Laterality Date   • DILATATION AND CURETTAGE  2010    Of Uterus   • TONSILLECTOMY AND ADENOIDECTOMY  1994       Review of Systems:  Review of Systems   Constitutional: Negative for activity change and fever.   HENT: Negative for ear pain, rhinorrhea, sinus pressure and voice change.    Eyes: Negative for visual disturbance.   Respiratory: Negative for cough and shortness of breath.    Cardiovascular: Negative for chest pain.   Gastrointestinal: Negative for diarrhea, nausea  "and vomiting.   Endocrine: Negative for cold intolerance and heat intolerance.   Genitourinary: Positive for dysuria and flank pain. Negative for frequency and urgency.   Musculoskeletal: Negative for arthralgias.   Skin: Negative for rash.   Neurological: Negative for syncope.   Hematological: Does not bruise/bleed easily.   Psychiatric/Behavioral: Negative for depressed mood. The patient is not nervous/anxious.        Physical Exam:  Vital Signs:  Visit Vitals  /66 (BP Location: Right arm)   Pulse 100   Temp 97 °F (36.1 °C)   Resp 16   Ht 170.2 cm (67\")   Wt 80.9 kg (178 lb 6.4 oz)   LMP 06/03/2020 (Approximate)   SpO2 97%   BMI 27.94 kg/m²       Physical Exam   Constitutional: She is oriented to person, place, and time. She appears well-developed and well-nourished. She is cooperative.   Cardiovascular: Normal rate, regular rhythm and normal heart sounds. Exam reveals no gallop and no friction rub.   No murmur heard.  Pulmonary/Chest: Effort normal and breath sounds normal. She has no wheezes. She has no rales.   Abdominal: Soft. Bowel sounds are normal. She exhibits no distension and no mass. There is no tenderness. There is no rebound and no guarding.   Neurological: She is alert and oriented to person, place, and time. Coordination normal.   Skin: Skin is warm and dry.   Left hand, around nails, mild erythema   Psychiatric: She has a normal mood and affect.   Vitals reviewed.      Assessment and Plan:  Problem List Items Addressed This Visit        Musculoskeletal and Integument    Paronychia of left middle finger    Current Assessment & Plan     Mild from working on fence. Multiple fingers on left hand. Start antibiotics  Diagnosis, treatment and and course discussed. Potential side effects discussed. Return if there is worsening or persistence of symptoms.            Relevant Medications    mupirocin (BACTROBAN) 2 % ointment    cephalexin (KEFLEX) 500 MG capsule       Genitourinary    Left renal stone "    Current Assessment & Plan     KUB does not show a stone. She will be seeing urology soon         Relevant Medications    HYDROcodone-acetaminophen (NORCO)  MG per tablet    Other Relevant Orders    XR Abdomen KUB (Completed)      Other Visit Diagnoses     Nausea    -  Primary    Relevant Medications    ondansetron (ZOFRAN) 8 MG tablet          An After Visit Summary and PPPS were given to the patient.             I wore protective equipment throughout this patient encounter to include mask and gloves. Hand hygiene was performed before donning protective equipment and after removal when leaving the room.

## 2020-06-30 PROBLEM — D72.829 ELEVATED WBC COUNT: Status: RESOLVED | Noted: 2020-06-25 | Resolved: 2020-06-30

## 2020-07-15 ENCOUNTER — HOSPITAL ENCOUNTER (OUTPATIENT)
Dept: GENERAL RADIOLOGY | Facility: HOSPITAL | Age: 30
Discharge: HOME | End: 2020-07-15
Admitting: UROLOGY

## 2020-07-15 DIAGNOSIS — N20.1 CALCULUS OF URETER: ICD-10-CM

## 2020-07-15 PROCEDURE — 74018 RADEX ABDOMEN 1 VIEW: CPT

## 2020-10-13 ENCOUNTER — APPOINTMENT (OUTPATIENT)
Dept: ULTRASOUND IMAGING | Facility: HOSPITAL | Age: 30
End: 2020-10-13

## 2020-10-13 ENCOUNTER — APPOINTMENT (OUTPATIENT)
Dept: GENERAL RADIOLOGY | Facility: HOSPITAL | Age: 30
End: 2020-10-13

## 2021-01-19 ENCOUNTER — OFFICE VISIT (OUTPATIENT)
Dept: FAMILY MEDICINE CLINIC | Facility: CLINIC | Age: 31
End: 2021-01-19

## 2021-01-19 VITALS
DIASTOLIC BLOOD PRESSURE: 74 MMHG | RESPIRATION RATE: 18 BRPM | SYSTOLIC BLOOD PRESSURE: 122 MMHG | BODY MASS INDEX: 29.1 KG/M2 | HEART RATE: 116 BPM | WEIGHT: 185.4 LBS | HEIGHT: 67 IN | TEMPERATURE: 97.3 F | OXYGEN SATURATION: 99 %

## 2021-01-19 DIAGNOSIS — N91.2 AMENORRHEA: Primary | ICD-10-CM

## 2021-01-19 DIAGNOSIS — Z32.01 POSITIVE PREGNANCY TEST: ICD-10-CM

## 2021-01-19 LAB
B-HCG UR QL: POSITIVE
INTERNAL NEGATIVE CONTROL: NEGATIVE
INTERNAL POSITIVE CONTROL: POSITIVE
Lab: ABNORMAL

## 2021-01-19 PROCEDURE — 99212 OFFICE O/P EST SF 10 MIN: CPT | Performed by: FAMILY MEDICINE

## 2021-01-19 PROCEDURE — 81025 URINE PREGNANCY TEST: CPT | Performed by: FAMILY MEDICINE

## 2021-01-19 NOTE — PROGRESS NOTES
Subjective   Mary Mathur is a 30 y.o. female.     Chief Complaint   Patient presents with   • Amenorrhea       Pt has taken two at home pregnancy tests both positive    Amenorrhea  This is a new problem. The current episode started more than 1 month ago. The problem occurs constantly. The problem has been unchanged. Associated symptoms include fatigue, headaches and nausea. Pertinent negatives include no abdominal pain, arthralgias, chest pain, chills, congestion, coughing, fever, rash, visual change or vomiting. Nothing aggravates the symptoms. She has tried nothing for the symptoms. The treatment provided no relief.        The following portions of the patient's history were reviewed and updated as appropriate: allergies, current medications, past family history, past medical history, past social history, past surgical history and problem list.    Allergies:  Allergies   Allergen Reactions   • Sulfa Antibiotics Itching       Social History:  Social History     Socioeconomic History   • Marital status: Unknown     Spouse name: Not on file   • Number of children: Not on file   • Years of education: Not on file   • Highest education level: Not on file   Tobacco Use   • Smoking status: Current Every Day Smoker     Types: Electronic Cigarette   • Smokeless tobacco: Never Used   Substance and Sexual Activity   • Alcohol use: Yes     Comment: Remotely quit alcohol use. Quit 01/2005   • Drug use: Not Currently     Comment: Pt recently quit 01/05/2014       Family History:  Family History   Problem Relation Age of Onset   • Cervical cancer Mother        Past Medical History :  Patient Active Problem List   Diagnosis   • Anemia   • Anxiety   • Herpes simplex vulvovaginitis   • Moderate episode of recurrent major depressive disorder (CMS/HCC)   • Neck pain   • Cervical radiculitis   • Gluten-sensitive enteropathy   • Fatigue   • Mixed hyperlipidemia   • Left renal stone   • Paronychia of left middle finger   •  "Amenorrhea   • Positive pregnancy test       Medication List:  No current outpatient medications on file.    Past Surgical History:  Past Surgical History:   Procedure Laterality Date   • DILATATION AND CURETTAGE  2010    Of Uterus   • TONSILLECTOMY AND ADENOIDECTOMY  1994       Review of Systems:  Review of Systems   Constitutional: Positive for fatigue. Negative for activity change, chills and fever.   HENT: Negative for congestion, ear pain, rhinorrhea, sinus pressure and voice change.    Eyes: Negative for visual disturbance.   Respiratory: Negative for cough and shortness of breath.    Cardiovascular: Negative for chest pain.   Gastrointestinal: Positive for nausea. Negative for abdominal pain, diarrhea and vomiting.   Endocrine: Negative for cold intolerance and heat intolerance.   Genitourinary: Positive for amenorrhea. Negative for frequency and urgency.   Musculoskeletal: Negative for arthralgias.   Skin: Negative for rash.   Neurological: Negative for syncope.   Hematological: Does not bruise/bleed easily.   Psychiatric/Behavioral: Negative for depressed mood. The patient is not nervous/anxious.        Physical Exam:  Vital Signs:  Vital Signs:   /74   Pulse 116   Temp 97.3 °F (36.3 °C)   Resp 18   Ht 170.2 cm (67\")   Wt 84.1 kg (185 lb 6.4 oz)   SpO2 99%   BMI 29.04 kg/m²     Result Review :   The following data was reviewed by: Magalys Street MD on 01/19/2021:  Common labs    Common Labsle 3/2/20 3/2/20 3/2/20 6/25/20    0831 0831 0831    Glucose  102 (A)     BUN  12     Creatinine  0.75     eGFR Non  Am  107     eGFR African Am  123     Sodium  137     Potassium  4.7     Chloride  103     Calcium  9.2     Total Protein  7.1     Albumin  4.2     Total Bilirubin  <0.2     Alkaline Phosphatase  66     AST (SGOT)  18     ALT (SGPT)  18     WBC 7.6   8.5   Hemoglobin 11.7   11.7   Hematocrit 37.2   38.5   Platelets 297   292   Total Cholesterol   185    Triglycerides   225 (A)    HDL " Cholesterol   34 (A)    LDL Cholesterol    106 (A)    (A) Abnormal value            UA    Urinalysis 6/25/20   Ketones, UA Negative   Leukocytes, UA Negative             Physical Exam  Vitals signs reviewed.   Constitutional:       Appearance: Normal appearance. She is well-developed.   HENT:      Head: Normocephalic and atraumatic.   Eyes:      General:         Right eye: No discharge.         Left eye: No discharge.   Cardiovascular:      Rate and Rhythm: Normal rate and regular rhythm.      Heart sounds: Normal heart sounds. No murmur. No friction rub. No gallop.    Pulmonary:      Effort: Pulmonary effort is normal. No respiratory distress.      Breath sounds: Normal breath sounds. No wheezing or rales.   Skin:     General: Skin is warm and dry.      Findings: No rash.   Neurological:      Mental Status: She is alert and oriented to person, place, and time.      Coordination: Coordination normal.      Gait: Gait normal.   Psychiatric:         Behavior: Behavior is cooperative.         Assessment and Plan:  Problems Addressed this Visit        Genitourinary and Reproductive     Amenorrhea - Primary    Relevant Orders    POC Pregnancy, Urine (Completed)    Positive pregnancy test     MARCUS 9/17/21   Currently 5 weeks and 6 days           Diagnoses       Codes Comments    Amenorrhea    -  Primary ICD-10-CM: N91.2  ICD-9-CM: 626.0     Positive pregnancy test     ICD-10-CM: Z32.01  ICD-9-CM: V72.42            An After Visit Summary and PPPS were given to the patient.           I wore protective equipment throughout this patient encounter to include mask, gloves and eye protection. Hand hygiene was performed before donning protective equipment and after removal when leaving the room.

## 2021-02-17 ENCOUNTER — TELEPHONE (OUTPATIENT)
Dept: FAMILY MEDICINE CLINIC | Facility: CLINIC | Age: 31
End: 2021-02-17

## 2021-02-17 NOTE — TELEPHONE ENCOUNTER
Caller: Mary Mathur    Relationship: Self    Best call back number: [phone]    What form or medical record are you requesting: CONFIRMATION OF PREGNANCY    Who is requesting this form or medical record from you: [facility]    How would you like to receive the form or medical records (pick-up, mail, fax): FAX  If fax, what is the fax number: [phone]  ATTN: ANDRES    Timeframe paperwork needed: BEFORE Friday 02/26/2021    Additional notes: PATIENT STATES SHE HAS AN APPOINTMENT SCHEDULED 02/26/2021 AND CLINIC IS REQUESTING CONFIRMATION OF PREGNANCY BEFORE APPOINTMENT

## 2021-02-23 NOTE — TELEPHONE ENCOUNTER
PATIENT CALLED IN STATED THAT SHE NEEDED TO CORRECT THE FAX NUMBER SHE GAVE THE CORRECT FAX NUMBER FOR [facility] [place] [phone] ATTENTION ANDRES THIS NEEDS TO BE SENT BY Friday IN ORDER FOR THE PATIENT TO BE SEEN    PLEASE ADVISE    [phone]

## 2021-08-19 ENCOUNTER — OFFICE VISIT (OUTPATIENT)
Dept: FAMILY MEDICINE CLINIC | Facility: CLINIC | Age: 31
End: 2021-08-19

## 2021-08-19 VITALS
SYSTOLIC BLOOD PRESSURE: 126 MMHG | OXYGEN SATURATION: 97 % | TEMPERATURE: 98.7 F | HEIGHT: 67 IN | BODY MASS INDEX: 31.52 KG/M2 | HEART RATE: 104 BPM | DIASTOLIC BLOOD PRESSURE: 82 MMHG | WEIGHT: 200.8 LBS | RESPIRATION RATE: 18 BRPM

## 2021-08-19 DIAGNOSIS — Z20.822 CLOSE EXPOSURE TO COVID-19 VIRUS: Primary | ICD-10-CM

## 2021-08-19 DIAGNOSIS — J01.00 ACUTE NON-RECURRENT MAXILLARY SINUSITIS: ICD-10-CM

## 2021-08-19 PROBLEM — R10.9 FLANK PAIN: Status: ACTIVE | Noted: 2021-08-19

## 2021-08-19 PROCEDURE — 99213 OFFICE O/P EST LOW 20 MIN: CPT | Performed by: FAMILY MEDICINE

## 2021-08-19 RX ORDER — PRENATAL VIT NO.126/IRON/FOLIC 28MG-0.8MG
TABLET ORAL DAILY
COMMUNITY

## 2021-08-19 RX ORDER — AMOXICILLIN 500 MG/1
500 TABLET, FILM COATED ORAL 3 TIMES DAILY
Qty: 30 TABLET | Refills: 0 | Status: SHIPPED | OUTPATIENT
Start: 2021-08-19

## 2021-08-19 NOTE — PROGRESS NOTES
Subjective   Mary Mathur is a 30 y.o. female.     Chief Complaint   Patient presents with   • URI       URI   This is a new problem. Maximum temperature: 99.2. Associated symptoms include congestion, coughing, ear pain, headaches (light headed), sneezing and a sore throat. Pertinent negatives include no abdominal pain, chest pain, diarrhea, nausea, rash, rhinorrhea, swollen glands, vomiting or wheezing. Treatments tried: tylenol, saline nasal spray, zyrtec.      She has had low grade temps. It occurred one. Her teeth hurt.     I personally reviewed and updated the patient's allergies, medications, problem list, and past medical, surgical, social, and family history. I have reviewed and confirmed the accuracy of the History of Present Illness and Review of Symptoms as documented by the MA/LPN/RN. Magalys Street MD    Allergies:  Allergies   Allergen Reactions   • Sulfa Antibiotics Itching       Social History:  Social History     Socioeconomic History   • Marital status: Unknown     Spouse name: Not on file   • Number of children: Not on file   • Years of education: Not on file   • Highest education level: Not on file   Tobacco Use   • Smoking status: Current Every Day Smoker     Types: Electronic Cigarette   • Smokeless tobacco: Never Used   • Tobacco comment: vape   Substance and Sexual Activity   • Alcohol use: Yes     Comment: Remotely quit alcohol use. Quit 01/2005   • Drug use: Not Currently     Comment: Pt recently quit 01/05/2014       Family History:  Family History   Problem Relation Age of Onset   • Cervical cancer Mother        Past Medical History :  Patient Active Problem List   Diagnosis   • Anemia   • Anxiety   • Herpes simplex vulvovaginitis   • Moderate episode of recurrent major depressive disorder (CMS/HCC)   • Neck pain   • Cervical radiculitis   • Gluten-sensitive enteropathy   • Fatigue   • Mixed hyperlipidemia   • Left renal stone   • Paronychia of left middle finger   • Amenorrhea  "  • Positive pregnancy test   • Flank pain   • Close exposure to COVID-19 virus   • Acute non-recurrent maxillary sinusitis       Medication List:    Current Outpatient Medications:   •  prenatal vitamin (prenatal, CLASSIC, vitamin) tablet, Take  by mouth Daily., Disp: , Rfl:   •  amoxicillin (AMOXIL) 500 MG tablet, Take 1 tablet by mouth 3 (Three) Times a Day., Disp: 30 tablet, Rfl: 0    Past Surgical History:  Past Surgical History:   Procedure Laterality Date   • DILATATION AND CURETTAGE  2010    Of Uterus   • TONSILLECTOMY AND ADENOIDECTOMY  1994       Review of Systems:  Review of Systems   Constitutional: Negative for activity change, chills and fever.   HENT: Positive for congestion, ear pain, sneezing and sore throat. Negative for postnasal drip, rhinorrhea, sinus pressure, swollen glands, trouble swallowing and voice change.    Eyes: Negative for pain, redness, itching and visual disturbance.   Respiratory: Positive for cough. Negative for shortness of breath and wheezing.    Cardiovascular: Negative for chest pain.   Gastrointestinal: Negative for abdominal pain, diarrhea, nausea and vomiting.   Endocrine: Negative for cold intolerance and heat intolerance.   Genitourinary: Negative for frequency and urgency.   Musculoskeletal: Negative for arthralgias.   Skin: Negative for rash.   Neurological: Negative for syncope.   Hematological: Does not bruise/bleed easily.   Psychiatric/Behavioral: Negative for depressed mood. The patient is not nervous/anxious.        Physical Exam:  Vital Signs:  Vital Signs:   /82   Pulse 104   Temp 98.7 °F (37.1 °C)   Resp 18   Ht 170.2 cm (67\")   Wt 91.1 kg (200 lb 12.8 oz)   SpO2 97%   BMI 31.45 kg/m²     Result Review :                Physical Exam  Vitals reviewed.   Constitutional:       Appearance: Normal appearance. She is well-developed.   HENT:      Head: Normocephalic and atraumatic.      Right Ear: External ear normal. No middle ear effusion. Tympanic " membrane is not injected, erythematous, retracted or bulging.      Left Ear: External ear normal.  No middle ear effusion. Tympanic membrane is not injected, erythematous, retracted or bulging.      Nose: Nose normal. No rhinorrhea.      Mouth/Throat:      Pharynx: No oropharyngeal exudate.   Eyes:      General:         Right eye: No discharge.         Left eye: No discharge.   Cardiovascular:      Rate and Rhythm: Normal rate and regular rhythm.      Heart sounds: Normal heart sounds. No murmur heard.   No friction rub. No gallop.    Pulmonary:      Effort: Pulmonary effort is normal. No respiratory distress.      Breath sounds: Normal breath sounds. No wheezing or rales.   Lymphadenopathy:      Cervical: No cervical adenopathy.   Skin:     General: Skin is warm and dry.      Findings: No rash.   Neurological:      Mental Status: She is alert and oriented to person, place, and time.      Coordination: Coordination normal.      Gait: Gait normal.   Psychiatric:         Behavior: Behavior is cooperative.         Assessment and Plan:  Problems Addressed this Visit        ENT    Acute non-recurrent maxillary sinusitis     Diagnosis, treatment and and course discussed. Potential side effects discussed. Return if there is worsening or persistence of symptoms.               Other    Close exposure to COVID-19 virus - Primary    Relevant Medications    amoxicillin (AMOXIL) 500 MG tablet    Other Relevant Orders    SARS-CoV-2 Antibodies ([facility])    COVID-19,[facility] ROUTINE, NP/OP SWAB IN TRANSPORT MEDIA OR ESWAB 72 HR TAT - Swab, Nasopharynx (Completed)      Diagnoses       Codes Comments    Close exposure to COVID-19 virus    -  Primary ICD-10-CM: Z20.822  ICD-9-CM: V01.79     Acute non-recurrent maxillary sinusitis     ICD-10-CM: J01.00  ICD-9-CM: 461.0            An After Visit Summary and PPPS were given to the patient.         I wore protective equipment throughout this patient encounter to include mask. Hand hygiene  was performed before donning protective equipment and after removal when leaving the room.

## 2021-08-20 LAB
LABCORP SARS-COV-2, NAA 2 DAY TAT: NORMAL
SARS-COV-2 RNA RESP QL NAA+PROBE: NOT DETECTED

## 2021-08-23 ENCOUNTER — TELEPHONE (OUTPATIENT)
Dept: FAMILY MEDICINE CLINIC | Facility: CLINIC | Age: 31
End: 2021-08-23

## 2021-08-23 PROBLEM — J01.00 ACUTE NON-RECURRENT MAXILLARY SINUSITIS: Status: ACTIVE | Noted: 2021-08-23

## 2021-08-23 NOTE — TELEPHONE ENCOUNTER
----- Message from Jessica Cramer MA sent at 8/23/2021 10:09 AM EDT -----    ----- Message -----  From: Magalys Street MD  Sent: 8/23/2021   7:41 AM EDT  To: Jessica Cramer MA    Negative covid test